# Patient Record
(demographics unavailable — no encounter records)

---

## 2025-03-07 NOTE — PHYSICAL EXAM
[Excoriation] : no perianal excoriation [Skin Tags] : there were no residual hemorrhoidal skin tags seen [Normal] : was normal [None] : there was no rectal mass  [FreeTextEntry1] : Medical assistant was present for the entire exam.  Anoscopy was performed for evaluation of the patients rectal bleeding  history . The risks, benefits and alternatives were reviewed.  A lighted anoscope was passed into the anal canal and the entire anal mucosal surface was inspected..   The findings revealed large internal hemorrhoids. No masses or lesions were identified.   The risks and benefits of rubber band ligation were discussed with the patient including but not limited to bleeding, pain, infection, and the need for future procedures. The anoscope was placed and rubber band ligation was performed of the internal hemorrhoids- raq and left lateral  with good result. The patient tolerated the procedure well. Appropriate postprocedure instructions were given to the patient.

## 2025-03-07 NOTE — HISTORY OF PRESENT ILLNESS
[FreeTextEntry1] : 60 y/o M presents for initial evaluation.   Referred by: Dr. Patino Reason for visit: hemorrhoid   PMHx: denies  PSHx :hemorrhoids surgery >45 years ago  Medications: rosuvastatin, metoprolol, ezetimibe, hydrocortisone cream  Allergies: NKDA  Denies family hx of CRC/IBD  Smoker: current smoker of 2 years   Colonoscopy: Dr. Patino about 2 years ago, with some polyps removed   Patient presents with c/o rectal bleeding for the past 6 months symptoms noted more when patient is stressed or has imbimbed in a bit more alcoholic drinks Patient reports last time he saw blood was 2 days ago dripping into the bowl.  Reports some external tissue swelling PCP Rxed HC 2.5% but reports has not seen much difference in symptoms.   Moves bowels, 1x a day, stool consistency varies , patient taking Fiber daily   Denies ASA/NSAIDS in the last 7 days

## 2025-05-09 NOTE — PHYSICAL EXAM
[Excoriation] : no perianal excoriation [Skin Tags] : there were no residual hemorrhoidal skin tags seen [Normal] : was normal [None] : there was no rectal mass  [FreeTextEntry1] : Medical assistant was present for the entire exam.  Anoscopy was performed for evaluation of the patients rectal bleeding  history . The risks, benefits and alternatives were reviewed.  A lighted anoscope was passed into the anal canal and the entire anal mucosal surface was inspected..   The findings revealed large internal hemorrhoids in left lateral position with moderate residual right anterior hemorrhoid. No masses or lesions were identified.   The risks and benefits of rubber band ligation were discussed with the patient including but not limited to bleeding, pain, infection, and the need for future procedures. The anoscope was placed and rubber band ligation was performed of the internal hemorrhoids- left lateral  with good result. The patient tolerated the procedure well. Appropriate postprocedure instructions were given to the patient.

## 2025-05-09 NOTE — HISTORY OF PRESENT ILLNESS
[FreeTextEntry1] : 62 y/o M presents for follow up.  PMHx: denies PSHx :hemorrhoids surgery >45 years ago Medications: rosuvastatin, metoprolol, ezetimibe, hydrocortisone cream Allergies: NKDA Denies family hx of CRC/IBD Smoker: current smoker of 2 years  Colonoscopy: Dr. Patino about 2 years ago, with some polyps removed  Seen 3/7/2025 for rectal bleeding x6 months. Exam noted large internal hemorrhoids. s/p RBL RAQ and Left Lateral   Patient returns for follow up. Patient states had pain for 2 weeks, managed w/ Tylenol and Ibuprofen States bleeding stopped but more recently noted external tissue swelling and noting larger than prior to banding.  Denies any rectal pain Moving bowels daily, fully formed, soft  Denies ASA/NSAIDs in the last 7 days.